# Patient Record
Sex: MALE | Race: WHITE | NOT HISPANIC OR LATINO | Employment: FULL TIME | URBAN - METROPOLITAN AREA
[De-identification: names, ages, dates, MRNs, and addresses within clinical notes are randomized per-mention and may not be internally consistent; named-entity substitution may affect disease eponyms.]

---

## 2018-07-20 ENCOUNTER — HISTORICAL (OUTPATIENT)
Dept: ADMINISTRATIVE | Facility: HOSPITAL | Age: 68
End: 2018-07-20

## 2018-11-02 ENCOUNTER — HISTORICAL (OUTPATIENT)
Dept: ADMINISTRATIVE | Facility: HOSPITAL | Age: 68
End: 2018-11-02

## 2018-11-02 LAB
ALBUMIN SERPL-MCNC: 4.3 GM/DL (ref 3.4–5)
ALBUMIN/GLOB SERPL: 1.26 {RATIO} (ref 1.5–2.5)
ALP SERPL-CCNC: 66 UNIT/L (ref 38–126)
ALT SERPL-CCNC: 23 UNIT/L (ref 7–52)
AST SERPL-CCNC: 21 UNIT/L (ref 15–37)
BILIRUB SERPL-MCNC: 0.6 MG/DL (ref 0.2–1)
BILIRUBIN DIRECT+TOT PNL SERPL-MCNC: 0.2 MG/DL (ref 0–0.5)
BILIRUBIN DIRECT+TOT PNL SERPL-MCNC: 0.4 MG/DL
BUN SERPL-MCNC: 17 MG/DL (ref 7–18)
CALCIUM SERPL-MCNC: 9.2 MG/DL (ref 8.5–10)
CHLORIDE SERPL-SCNC: 103 MMOL/L (ref 98–107)
CHOLEST SERPL-MCNC: 113 MG/DL (ref 0–200)
CHOLEST/HDLC SERPL: 3.2 {RATIO}
CO2 SERPL-SCNC: 30 MMOL/L (ref 21–32)
CREAT SERPL-MCNC: 0.95 MG/DL (ref 0.6–1.3)
EST. AVERAGE GLUCOSE BLD GHB EST-MCNC: 131 MG/DL
GLOBULIN SER-MCNC: 3.4 GM/DL (ref 1.2–3)
GLUCOSE SERPL-MCNC: 140 MG/DL (ref 74–106)
HBA1C MFR BLD: 6.2 % (ref 4.4–6.4)
HDLC SERPL-MCNC: 35 MG/DL (ref 35–60)
LDLC SERPL CALC-MCNC: 64 MG/DL (ref 0–129)
POTASSIUM SERPL-SCNC: 4.5 MMOL/L (ref 3.5–5.1)
PROT SERPL-MCNC: 7.7 GM/DL (ref 6.4–8.2)
SODIUM SERPL-SCNC: 139 MMOL/L (ref 136–145)
TRIGL SERPL-MCNC: 91 MG/DL (ref 30–150)
VLDLC SERPL CALC-MCNC: 18.2 MG/DL

## 2019-01-03 ENCOUNTER — HISTORICAL (OUTPATIENT)
Dept: ADMINISTRATIVE | Facility: HOSPITAL | Age: 69
End: 2019-01-03

## 2019-05-09 ENCOUNTER — HISTORICAL (OUTPATIENT)
Dept: ADMINISTRATIVE | Facility: HOSPITAL | Age: 69
End: 2019-05-09

## 2019-05-09 LAB
ABS NEUT (OLG): 4.8 X10(3)/MCL (ref 2.1–9.2)
ALBUMIN SERPL-MCNC: 4.3 GM/DL (ref 3.4–5)
ALBUMIN/GLOB SERPL: 1.16 {RATIO} (ref 1.5–2.5)
ALP SERPL-CCNC: 77 UNIT/L (ref 38–126)
ALT SERPL-CCNC: 27 UNIT/L (ref 7–52)
AST SERPL-CCNC: 21 UNIT/L (ref 15–37)
BILIRUB SERPL-MCNC: 0.6 MG/DL (ref 0.2–1)
BILIRUBIN DIRECT+TOT PNL SERPL-MCNC: 0.2 MG/DL (ref 0–0.5)
BILIRUBIN DIRECT+TOT PNL SERPL-MCNC: 0.4 MG/DL
BUN SERPL-MCNC: 17 MG/DL (ref 7–18)
CALCIUM SERPL-MCNC: 9 MG/DL (ref 8.5–10)
CHLORIDE SERPL-SCNC: 102 MMOL/L (ref 98–107)
CHOLEST SERPL-MCNC: 110 MG/DL (ref 0–200)
CHOLEST/HDLC SERPL: 3.2 {RATIO}
CO2 SERPL-SCNC: 28 MMOL/L (ref 21–32)
CREAT SERPL-MCNC: 0.96 MG/DL (ref 0.6–1.3)
CREAT UR-MCNC: 300 MG/DL
ERYTHROCYTE [DISTWIDTH] IN BLOOD BY AUTOMATED COUNT: 12.8 % (ref 11.5–17)
EST. AVERAGE GLUCOSE BLD GHB EST-MCNC: 137 MG/DL
GLOBULIN SER-MCNC: 3.7 GM/DL (ref 1.2–3)
GLUCOSE SERPL-MCNC: 135 MG/DL (ref 74–106)
HBA1C MFR BLD: 6.4 % (ref 4.4–6.4)
HCT VFR BLD AUTO: 43.3 % (ref 42–52)
HDLC SERPL-MCNC: 34 MG/DL (ref 35–60)
HGB BLD-MCNC: 14.6 GM/DL (ref 14–18)
LDLC SERPL CALC-MCNC: 58 MG/DL (ref 0–129)
LYMPHOCYTES # BLD AUTO: 2.8 X10(3)/MCL (ref 0.6–3.4)
LYMPHOCYTES NFR BLD AUTO: 32.4 % (ref 13–40)
MCH RBC QN AUTO: 33 PG (ref 27–31.2)
MCHC RBC AUTO-ENTMCNC: 34 GM/DL (ref 32–36)
MCV RBC AUTO: 98 FL (ref 80–94)
MICROALBUMIN UR-MCNC: 30 MG/L
MICROALBUMIN/CREAT RATIO PNL UR: <30 MG/GM
MONOCYTES # BLD AUTO: 0.9 X10(3)/MCL (ref 0.1–1.3)
MONOCYTES NFR BLD AUTO: 11.1 % (ref 0.1–24)
NEUTROPHILS NFR BLD AUTO: 56.5 % (ref 47–80)
PLATELET # BLD AUTO: 291 X10(3)/MCL (ref 130–400)
PMV BLD AUTO: 8.1 FL (ref 9.4–12.4)
POTASSIUM SERPL-SCNC: 4.7 MMOL/L (ref 3.5–5.1)
PROT SERPL-MCNC: 8 GM/DL (ref 6.4–8.2)
PSA SERPL-MCNC: 0.37 NG/ML (ref 0–4.5)
RBC # BLD AUTO: 4.42 X10(6)/MCL (ref 4.7–6.1)
SODIUM SERPL-SCNC: 136 MMOL/L (ref 136–145)
TRIGL SERPL-MCNC: 92 MG/DL (ref 30–150)
VLDLC SERPL CALC-MCNC: 18.4 MG/DL
WBC # SPEC AUTO: 8.5 X10(3)/MCL (ref 4.5–11.5)

## 2021-05-14 ENCOUNTER — HISTORICAL (OUTPATIENT)
Dept: ADMINISTRATIVE | Facility: HOSPITAL | Age: 71
End: 2021-05-14

## 2021-12-08 LAB — GLUCOSE SERPL-MCNC: 140 MG/DL (ref 70–110)

## 2022-04-07 ENCOUNTER — HISTORICAL (OUTPATIENT)
Dept: ADMINISTRATIVE | Facility: HOSPITAL | Age: 72
End: 2022-04-07

## 2022-04-24 VITALS
HEIGHT: 68 IN | DIASTOLIC BLOOD PRESSURE: 72 MMHG | SYSTOLIC BLOOD PRESSURE: 116 MMHG | WEIGHT: 180.13 LBS | BODY MASS INDEX: 27.3 KG/M2

## 2022-04-28 NOTE — OP NOTE
Patient:   Diego Torres            MRN: [id number]            FIN: [id number]               Age:   67 years     Sex:  Male     :  1950   Associated Diagnoses:   None   Author:   Chano Alvarez MD      Preoperative Diagnosis: Cataract Left eye    Postoperative Diagnosis: Cataract Left eye    Procedure: Phacoemulsification with intaocular lens implantations Left eye    Surgeon: Chano Alvarez MD    Assistant: Amber Narayan, [facility]    Anestheisa: Topical    Complications: None    The patient was brought to the [facility] and positioned.  A surgical timeout, capsulotomy, lens fragmentation and limbal relaxing incisions were performed.  The patient was brought into the operating suite, where the patient was correctly identified as was the operative eye via timeout.  The patient was prepped and draped in a sterile ophthalmic fashion.  A lid speculum was placed in the operative eye and the microscope was brought into place.  A 1.0mm paracentesis was then made at (6) o'clock.  The anterior chamber was filled with Endocoat.  A (temporal) clear corneal incision was made with a 2.4 mm keratome.  Hydrodissection and hydrodelineation was performed with upreserved 1% Xylocaine.  The nucleus was then phacoemulsified with the Abbott phacoemulsification hand-piece with a total of (14) EFX.  The cortex was then removed with the Kd I/A hand-piece. An CRISTO lens model ([id number]) with a power of (18.0) was placed in the capsular bag.  The Helon was then removed from the eye with the Kd I/A hand piece. The anterior chamber was inflated and the wounds were hydrated with BSS.  The wounds were checked with Weck-Abigail sponges and found to be watertight.  The lid speculum was removed and topical antibiotics were placed on the operative eye.  The patient was brought to PACU in good condition.        Surgery Date 18 [facility]

## 2022-04-28 NOTE — OP NOTE
Patient:   Diego Torres            MRN: [id number]            FIN: [id number]               Age:   70 years     Sex:  Male     :  1950   Associated Diagnoses:   None   Author:   Chano Alvarez MD      PREOPERATIVE DIAGNOSIS: Cataract Right eye    POSTOPERATIVE DIAGNOSIS: Cataract Right eye    PROCEDURE: Phacoemulsification with intraocular lens implantations Right eye    SURGEON: Chano Alvarez MD    ASSISTANT: Amber Narayan, [facility]    ANESTHEISA: MAC    COMPLICATIONS: NONE    DESCRIPTION OF PROCEDURE: The patient was to the [employer] laser.  The patient was marked at 0 & 180 degrees.   A time out was performed.  The capsulotomy and lens fragmentation were completed.  Then the patient was brought into the operating suite, where the patient was correctly identified as was the operative eye via timeout.  The patient was prepped and draped in a sterile ophthalmic fashion.  A lid speculum was placed in the operative eye and the microscope was brought into place.  The eye was then marked using a axis marker for the desired position of the IOL implant.  A 1.0 mm paracentesis was then made at (12) o'clock.  The anterior chamber was filled with Endocoat.  A (temporal) clear corneal incision was made with a 2.4 mm keratome blade.  A 6.0 mm corneal marking ring was used to sonja the cornea centered over the visual axis.  A 5.00 mm continuous curvilinear capsulorhexis was fashioned using a cystotome and microcapsular forceps.  Hydrodissection and hydrodelineation was performed with unpreserved 1% Xylocaine.  The nucleus was then phacoemulsified with the Abbott phacoemulsification hand-piece with a total of (11) EFX.  The cortex was then removed with the I/A hand-piece.  The capsular bag was filled with Helon.  The lens model ([id number]) with the power of (19.0) was placed in the capsular bag at (175) degrees.  The Helon was then removed from the eye with the I/A hand -piece.  The anterior chamber was inflated and the  wounds were hydrated with BSS.  The wounds were checked with Weck-Abigail sponges and found to be watertight.  The lid speculum was removed and topical antibiotics were placed on the operative eye.  The patient was brought to the PACU in good condition.

## 2022-05-01 NOTE — HISTORICAL [facility] CERNER
This is a historical note converted from Lindsey. Formatting and pictures may have been removed.  Please reference Lindsey for original formatting and attached multimedia. Chief Complaint  6 month recheck  History of Present Illness  having some right knee pain for past couple months so hasnt been walking as much;  some cough and congestion/occasional wheeze at night  Review of Systems  Comprehensive Review of Systems performed with no exceptions for new complaints other than as noted in HPI.  Physical Exam  Vitals & Measurements  HR:?76(Peripheral)? BP:?126/78?  HT:?173?cm? HT:?173?cm? WT:?82?kg? WT:?82?kg? BMI:?27.4?  ?  PHYSICAL EXAM  ?   WDWN patient in NAD, ?AFVSS  HEENT - no acute abnormality  ??????????????? oropharynx WNL  HEART - RRR  LUNGS -? CTA  ABDOMEN - benign, NTND;? no peritoneal signs  EXTREMITIES - No CCE  SKIN - warm, dry, intact  PSYCH - affect appropriate;? alert and oriented  NEURO- no new deficits noted;? cranial nerves grossly intact  ?mild tenderness right medical joint line  small SK left lower later hip  Assessment/Plan  1.?HLD (hyperlipidemia)  ?last LDL 66  Ordered:  Clinic Follow up, *Est. 05/22/19 8:45:00 CDT, PLEASE MAKE THIS A 30 MINUTE CPX, Order for future visit, HLD (hyperlipidemia), [facility]  Office/Outpatient Visit Level 3 Established [id number] PC, HLD (hyperlipidemia)  Diabetes mellitus, type 2  Elevated coronary artery calcium score  Right knee pain, [facility] - AFP, 11/02/18 8:31:00 CDT  ?  2.?Diabetes mellitus, type 2  ?last A1c 6.4  Ordered:  Office/Outpatient Visit Level 3 Established [id number] PC, HLD (hyperlipidemia)  Diabetes mellitus, type 2  Elevated coronary artery calcium score  Right knee pain, [facility] - [facility], 11/02/18 8:31:00 CDT  ?  3.?Elevated coronary artery calcium score  ?asymptomatic  Ordered:  Office/Outpatient Visit Level 3 Established [id number] PC, HLD (hyperlipidemia)  Diabetes mellitus, type 2  Elevated coronary artery calcium score  Right knee pain, [facility]  [facility] - [facility], 11/02/18 8:31:00 CDT  ?  4.?Right knee pain  ?knee brace/ gave packet of exercises/ hell call if needs PT  Ordered:  Office/Outpatient Visit Level 3 Established 11593 PC, HLD (hyperlipidemia)  Diabetes mellitus, type 2  Elevated coronary artery calcium score  Right knee pain, [facility] - [facility], 11/02/18 8:31:00 CDT  ?   Problem List/Past Medical History  Ongoing  Anemia  Cataract  Coronary artery disease  Diabetes mellitus, type 2  ED (erectile dysfunction)  Elevated coronary artery calcium score  HLD (hyperlipidemia)  Medication management  Historical  Upper back pain  Procedure/Surgical History  29202 - LT CATARACT W/IOL 1 STA PHACO (Left) (07/20/2018)  CT of heart without contrast with calcium scoring (02/01/2011)  Colonoscopy (03/11/2009)   Medications  aspirin 81 mg oral tablet, 81 mg= 1 tab(s), Oral, Daily  ATORVASTATIN TAB 80MG  PROLENSA SOL 0.07%  Zetia, 10 mg, Oral, Daily  Allergies  No Known Medication Allergies  Social History  Alcohol  Current, 03/28/2018  Employment/School  Employed, 09/27/2018  Home/Environment  Lives with Spouse. Living situation: Home/Independent., 09/27/2018  Substance Abuse  Never, 09/27/2018  Tobacco  Never smoker, 06/02/2016  Family History  Dementia: Mother.  Heart disease.: Father.  Hyperlipidemia.: Brother.  Hypertension.: Brother.  Myocardial infarct: Father.  Immunizations  Vaccine Date Status   pneumococcal 23-polyvalent vaccine 03/28/2018 Given   pneumococcal 13-valent conjugate vaccine 03/08/2016 Recorded   influenza virus vaccine, inactivated 10/07/2015 Recorded   zoster vaccine live 03/05/2015 Recorded   tetanus/diphth/pertuss (Tdap) adult/adol 10/14/2011 Recorded   Health Maintenance  Health Maintenance  ???Pending?(in the next year)  ??? ??OverDue  ??? ? ? ?Coronary Artery Disease Maintenance-Antiplatelet Agent Prescribed due??and every?  ??? ? ? ?Coronary Artery Disease Maintenance-Lipid Lowering Therapy due??and every?  ??? ? ? ?Pneumococcal Vaccine  due??and every?  ??? ? ? ?Aspirin Therapy for CVD Prevention due??06/02/17??and every 1??year(s)  ??? ??Due?  ??? ? ? ?ADL Screening due??11/03/18??and every 1??year(s)  ??? ? ? ?Alcohol Misuse Screening due??11/03/18??and every 1??year(s)  ??? ? ? ?Cognitive Screening due??11/03/18??and every 1??year(s)  ??? ? ? ?Diabetes Maintenance-Eye Exam due??11/03/18??and every?  ??? ? ? ?Diabetes Maintenance-Foot Exam due??11/03/18??and every?  ??? ? ? ?Diabetes Maintenance-Medication Prescribed due??11/03/18??and every 1??year(s)  ??? ? ? ?Diabetes Maintenance-Urine Dipstick due??11/03/18??Variable frequency  ??? ? ? ?Functional Assessment due??11/03/18??and every 1??year(s)  ??? ? ? ?Geriatric Depression Screening due??11/03/18??and every 1??year(s)  ??? ? ? ?Smoking Cessation due??11/03/18??Variable frequency  ??? ??Due In Future?  ??? ? ? ?Colorectal Screening (Senior Wellness) not due until??03/09/19??and every 10??year(s)  ??? ? ? ?Diabetes Maintenance-Microalbumin not due until??03/28/19??and every 1??year(s)  ??? ? ? ?Advance Directive not due until??07/11/19??and every 1??year(s)  ??? ? ? ?Fall Risk Assessment not due until??07/20/19??and every 1??year(s)  ??? ? ? ?Coronary Artery Disease Maintenance-Electrocardiogram not due until??07/20/19??and every 1??year(s)  ??? ? ? ?Diabetes Maintenance-Fasting Lipid Profile not due until??11/02/19??and every 1??year(s)  ??? ? ? ?Diabetes Maintenance-HgbA1c not due until??11/02/19??and every 1??year(s)  ??? ? ? ?Obesity Screening not due until??11/02/19??and every 1??year(s)  ??? ? ? ?Coronary Artery Disease Maintenance-BMP not due until??11/02/19??and every 1??year(s)  ??? ? ? ?Diabetes Maintenance-Serum Creatinine not due until??11/02/19??and every 1??year(s)  ???Satisfied?(in the past 1 year)  ??? ??Satisfied?  ??? ? ? ?Advance Directive on??07/11/18.??Satisfied by Jenny Moreno LPN  ??? ? ? ?Blood Pressure Screening on??11/02/18.??Satisfied by Mehnaz Juares  ??? ? ?  ?Body Mass Index Check on??11/02/18.??Satisfied by Mehnaz Juares  ??? ? ? ?Coronary Artery Disease Maintenance-BMP on??11/02/18.??Satisfied by Ibrahima Chau  ??? ? ? ?Coronary Artery Disease Maintenance-Electrocardiogram on??07/20/18.??Satisfied by Yadi Thomas RN  ??? ? ? ?Coronary Artery Disease Maintenance-Lipid Lowering Therapy on??07/11/18.  ??? ? ? ?Diabetes Maintenance-Fasting Lipid Profile on??11/02/18.??Satisfied by Ibrahima Chau  ??? ? ? ?Diabetes Maintenance-HgbA1c on??11/02/18.??Satisfied by Ibrahima Chau  ??? ? ? ?Diabetes Maintenance-Serum Creatinine on??11/02/18.??Satisfied by Ibrahima Chau  ??? ? ? ?Diabetes Maintenance-Microalbumin on??03/28/18.??Satisfied by Kat Courtney  ??? ? ? ?Diabetes Screening on??11/02/18.??Satisfied by Ibrahima Chau  ??? ? ? ?Fall Risk Assessment on??07/20/18.??Satisfied by Yadi Thomas RN  ??? ? ? ?Lipid Screening on??11/02/18.??Satisfied by Ibrahima Chau  ??? ? ? ?Obesity Screening on??11/02/18.??Satisfied by Mehnaz Juares  ??? ? ? ?Pneumococcal Vaccine on??03/28/18.??Satisfied by Judit Nash  ?  ?

## 2022-05-01 NOTE — HISTORICAL [facility] CERNER
This is a historical note converted from Lindsey. Formatting and pictures may have been removed.  Please reference Lindsey for original formatting and attached multimedia. Chief Complaint  wellness  History of Present Illness  having wheezing when lying down at night, got better after last OV with huseyin  h/o childhood asthma  seeing eye doctor today  no other c/o  no cardiac or respiratory issues  ?  Review of Systems  Except as noted above has no new complaints regarding:  Constitutional:?no weight gain,?no weight loss,?no fatigue,?no fever,?no chills,?no weakness.  Eyes:?no vision loss/changes,??no pain,?no double vision,??  Head:?no headache,?no head injury,?no neck pain.?  Neck:??no lumps,?no swollen glands,?no stiffness.  Ears:??no ringing,?no earache,?no drainage.?  Nose:?no stuffiness,?no discharge,?no itching,no postnasal drip, ?no nosebleeds,?no sinus pain.  Throat:?no dry mouth,?no sore throat,?no hoarseness,?no thrush,?no non-healing sores.  Cardiovascular:?no chest pain or discomfort,?no tightness,?no palpitations,?no SOB with activity,?no difficulty breathing while supine,?no swelling,?no sudden awakening from sleep with SOB.  Vascular:?no calf pain with walking,?no leg cramping.  Respiratory:??no cough,?no sputum,?no coughing up blood,?no SOB,?no wheezing,?no painful breathing.  Gastrointestinal:?no swallowing difficulty,?no heartburn,?no change in appetite,?no nausea,?no change in bowel habits,?no rectal bleeding,?no constipation,?no diarrhea,?no yellow eyes or skin.  Urinary:?no frequency,?no urgency,?no burning or pain,?no blood in urine,?no incontinence,?no change in urinary strength.  Musculoskeletal:?no muscle or joint pain,?no stiffness,?,?no redness of joints,?no swelling of joints,?no trauma.  Skin:?no rashes,?no lumps,?no itching,?no dryness,??no hair or nail changes.  Neurologic:?no dizziness,?no fainting,?no seizures,?no weakness,?no numbness,?no tingling,?no tremors.  Psychiatric:?no  nervousness,??no depression,?no memory loss.  Endocrine:?no heat or cold intolerance,??no frequent urination,?no thirst,?no change in appetite.  Hematologic:?no ease of bruising,?no ease of bleeding.  ?  ?  ?  ?  Physical Exam  Vitals & Measurements  HR:?78(Peripheral)? BP:?128/78?  HT:?172?cm? WT:?80.6?kg? BMI:?27.24?  ?  PHYSICAL EXAM  ?   Well developed, well nourished, NAD, alert and oriented x4  Vitals reviewed  Head: NCAT  Eyes: EOMI, conjunctiva WNL, pupils symmetric  Ears: TMs and EACs clear  Nose: Mucosa WNL, No discharge, No sinus tenderness  O/P: No erythema or exudate  Neck: supple, FROM, no LA, no thyromegaly, no bruits auscultated  CV: RRR no MRG, peripheral pulses intact  Pulmonary: Effort normal and breath sounds normal, no wheeze  Abdomen: soft, NTND, no HSM;? ?NABS; no peritoneal signs?????  Musculoskeletal:? no tenderness, joints wnl for acute changes, FROM, neg SLR, no CCE  Skin: warm, dry, intact  Neuro: no motor/sensory deficits, reflexes 2+, cranial nerves grossly intact, cerebellar function appears intact  Lymphadenopathy: no abnormalities noted  Psychiatric: Cooperative, appropriate mood and affect, appears to have normal judgment  ?  ?  ?  ?  Assessment/Plan  1.?Wellness examination?Z00.00  ?up to date with screenings  work on diet/exercise  2.?HLD (hyperlipidemia)?E78.5  will recheck  3.?Diabetes mellitus, type 2?E11.9  good control  4.?Anemia?D64.9  has been good  5.?Asthma?J45.909  will try Symbicort , he will call if develops issues  rec shingrix  Referrals  Clinic Follow up, *Est. 11/18/19 9:45:00 CST, Order for future visit, Diabetes mellitus, type 2, [facility]   Problem List/Past Medical History  Ongoing  Anemia  Asthma  Cataract  Coronary artery disease  Diabetes mellitus, type 2  ED (erectile dysfunction)  Elevated coronary artery calcium score  HLD (hyperlipidemia)  Medication management  Wellness examination  Historical  Upper back pain  Procedure/Surgical History  35386 -   CATARACT W/IOL 1 STA PHACO (Left) (07/20/2018)  Extracapsular cataract removal with insertion of intraocular lens prosthesis (1 stage procedure), manual or mechanical technique (eg, irrigation and aspiration or phacoemulsification) (07/20/2018)  Replacement of Left Lens with Synthetic Substitute, Percutaneous Approach (07/20/2018)  CT of heart without contrast with calcium scoring (02/01/2011)  Colonoscopy (03/11/2009)   Medications  aspirin 81 mg oral tablet, 81 mg= 1 tab(s), Oral, Daily  ATORVASTATIN TAB 80MG  Symbicort 160 mcg-4.5 mcg/inh inhalation aerosol, 2 puff(s), INH, BID, 3 refills  Zetia, 10 mg, Oral, Daily  Allergies  No Known Medication Allergies  Social History  Alcohol  Current, 03/28/2018  Employment/School  Employed, 09/27/2018  Home/Environment  Lives with Spouse. Living situation: Home/Independent., 09/27/2018  Substance Abuse  Never, 09/27/2018  Tobacco  Never (less than 100 in lifetime), N/A, 01/03/2019  Never smoker, 06/02/2016  Family History  Dementia: Mother.  Heart disease.: Father.  Hyperlipidemia.: Brother.  Hypertension.: Brother.  Myocardial infarct: Father.  Immunizations  Vaccine Date Status   influenza virus vaccine, inactivated 10/2018 Recorded   pneumococcal 23-polyvalent vaccine 03/28/2018 Given   pneumococcal 13-valent conjugate vaccine 03/08/2016 Recorded   influenza virus vaccine, inactivated 10/07/2015 Recorded   zoster vaccine live 03/05/2015 Recorded   tetanus/diphth/pertuss (Tdap) adult/adol 10/14/2011 Recorded   Health Maintenance  Health Maintenance  ???Pending?(in the next year)  ??? ??OverDue  ??? ? ? ?Coronary Artery Disease Maintenance-Antiplatelet Agent Prescribed due??and every?  ??? ? ? ?Coronary Artery Disease Maintenance-Lipid Lowering Therapy due??and every?  ??? ? ? ?Pneumococcal Vaccine due??and every?  ??? ? ? ?Aspirin Therapy for CVD Prevention due??06/02/17??and every 1??year(s)  ??? ? ? ?Advance Directive due??01/01/19??and every 1??year(s)  ??? ? ?  ?Alcohol Misuse Screening due??01/01/19??and every 1??year(s)  ??? ? ? ?Cognitive Screening due??01/01/19??and every 1??year(s)  ??? ? ? ?Fall Risk Assessment due??01/01/19??and every 1??year(s)  ??? ? ? ?Functional Assessment due??01/01/19??and every 1??year(s)  ??? ? ? ?Geriatric Depression Screening due??01/01/19??and every 1??year(s)  ??? ? ? ?Colorectal Screening (Senior Wellness) due??03/09/19??and every 10??year(s)  ??? ??Due?  ??? ? ? ?ADL Screening due??05/11/19??and every 1??year(s)  ??? ? ? ?Asthma Management-Asthma Education due??05/11/19??and every 6??month(s)  ??? ? ? ?Asthma Management-Spirometry due??05/11/19??Variable frequency  ??? ? ? ?Asthma Management-[place] Flow due??05/11/19??Variable frequency  ??? ? ? ?Asthma Management-Written Action Plan due??05/11/19??and every 6??month(s)  ??? ? ? ?Diabetes Maintenance-Foot Exam due??05/11/19??and every?  ??? ? ? ?Diabetes Maintenance-Medication Prescribed due??05/11/19??and every 1??year(s)  ??? ? ? ?Diabetes Maintenance-Urine Dipstick due??05/11/19??Variable frequency  ??? ??Due In Future?  ??? ? ? ?Diabetes Maintenance-Eye Exam not due until??06/28/19??and every 1??year(s)  ??? ? ? ?Coronary Artery Disease Maintenance-Electrocardiogram not due until??07/20/19??and every 1??year(s)  ??? ? ? ?Obesity Screening not due until??01/01/20??and every 1??year(s)  ??? ? ? ?Diabetes Maintenance-Fasting Lipid Profile not due until??05/08/20??and every 1??year(s)  ??? ? ? ?Diabetes Maintenance-HgbA1c not due until??05/08/20??and every 1??year(s)  ??? ? ? ?Hypertension Management-BMP not due until??05/08/20??and every 1??year(s)  ??? ? ? ?Hypertension Management-Blood Pressure not due until??05/08/20??and every 1??year(s)  ??? ? ? ?Coronary Artery Disease Maintenance-BMP not due until??05/09/20??and every 1??year(s)  ??? ? ? ?Diabetes Maintenance-Microalbumin not due until??05/09/20??and every 1??year(s)  ??? ? ? ?Diabetes Maintenance-Serum Creatinine not due  until??05/09/20??and every 1??year(s)  ???Satisfied?(in the past 1 year)  ??? ??Satisfied?  ??? ? ? ?Advance Directive on??07/11/18.??Satisfied by Jenny Moreno LPN  ??? ? ? ?Blood Pressure Screening on??05/09/19.??Satisfied by Mehnaz Juares LPN  ??? ? ? ?Body Mass Index Check on??05/09/19.??Satisfied by Mehnaz Juares LPN  ??? ? ? ?Coronary Artery Disease Maintenance-BMP on??05/09/19.??Satisfied by Ibrahima Chau  ??? ? ? ?Coronary Artery Disease Maintenance-Electrocardiogram on??07/20/18.??Satisfied by Yadi Thomas RN  ??? ? ? ?Coronary Artery Disease Maintenance-Lipid Lowering Therapy on??07/11/18.  ??? ? ? ?Diabetes Maintenance-Fasting Lipid Profile on??05/09/19.??Satisfied by Ibrahima Chau  ??? ? ? ?Diabetes Maintenance-HgbA1c on??05/09/19.??Satisfied by Ibrahima Chau  ??? ? ? ?Diabetes Maintenance-Microalbumin on??05/09/19.??Satisfied by Saira Garcia  ??? ? ? ?Diabetes Maintenance-Serum Creatinine on??05/09/19.??Satisfied by Ibrahima Chau  ??? ? ? ?Diabetes Screening on??05/09/19.??Satisfied by Ibrahima Chau  ??? ? ? ?Fall Risk Assessment on??07/20/18.??Satisfied by Yadi Thomas RN  ??? ? ? ?Hypertension Management-BMP on??05/09/19.??Satisfied by Ibrahima Chau  ??? ? ? ?Hypertension Management-Blood Pressure on??05/09/19.??Satisfied by Mehnaz Juares LPN  ??? ? ? ?Influenza Vaccine on??10/01/18.??Satisfied by Essence Martinez CMA  ??? ? ? ?Lipid Screening on??05/09/19.??Satisfied by Ibrahima Chau  ??? ? ? ?Obesity Screening on??05/09/19.??Satisfied by Mehnaz Juares LPN  ?  ?

## 2022-05-30 PROBLEM — D64.9 ANEMIA: Status: ACTIVE | Noted: 2022-05-30

## 2022-05-30 PROBLEM — I65.23 CAROTID STENOSIS, ASYMPTOMATIC, BILATERAL: Status: ACTIVE | Noted: 2020-09-21

## 2022-05-30 PROBLEM — Z95.1 HISTORY OF CORONARY ARTERY BYPASS GRAFT: Status: ACTIVE | Noted: 2022-05-30

## 2022-05-30 PROBLEM — R73.9 HYPERGLYCEMIA: Status: ACTIVE | Noted: 2022-05-30

## 2022-05-30 PROBLEM — I25.110 CORONARY ARTERY DISEASE INVOLVING NATIVE CORONARY ARTERY OF NATIVE HEART WITH UNSTABLE ANGINA PECTORIS: Status: ACTIVE | Noted: 2020-09-18

## 2022-05-30 PROBLEM — E78.5 HYPERLIPIDEMIA: Status: ACTIVE | Noted: 2022-05-30

## 2022-05-30 PROBLEM — Z79.899 HIGH RISK MEDICATION USE: Status: ACTIVE | Noted: 2022-05-30

## 2022-05-30 PROBLEM — J45.909 ASTHMA: Status: ACTIVE | Noted: 2022-05-30

## 2022-05-30 PROBLEM — H26.9 CATARACT: Status: ACTIVE | Noted: 2022-05-30

## 2022-05-30 PROBLEM — N52.9 ED (ERECTILE DYSFUNCTION): Status: ACTIVE | Noted: 2022-05-30

## 2022-05-31 PROBLEM — Z00.00 ENCOUNTER FOR ANNUAL WELLNESS EXAM IN MEDICARE PATIENT: Status: ACTIVE | Noted: 2022-05-31

## 2022-05-31 PROBLEM — I11.0 HYPERTENSIVE HEART DISEASE WITH HEART FAILURE: Status: ACTIVE | Noted: 2022-05-31

## 2022-05-31 PROBLEM — N40.1 BENIGN PROSTATIC HYPERPLASIA WITH NOCTURIA: Status: ACTIVE | Noted: 2022-05-31

## 2022-05-31 PROBLEM — Z95.1 S/P CABG X 3: Status: ACTIVE | Noted: 2020-11-02

## 2022-05-31 PROBLEM — R35.1 BENIGN PROSTATIC HYPERPLASIA WITH NOCTURIA: Status: ACTIVE | Noted: 2022-05-31

## 2022-05-31 PROBLEM — E11.9 TYPE 2 DIABETES MELLITUS WITHOUT COMPLICATION, WITHOUT LONG-TERM CURRENT USE OF INSULIN: Status: ACTIVE | Noted: 2022-05-31

## 2022-05-31 PROBLEM — I25.10 ARTERIOSCLEROSIS OF CORONARY ARTERY: Status: ACTIVE | Noted: 2020-09-18

## 2022-09-05 PROBLEM — Z00.00 ENCOUNTER FOR ANNUAL WELLNESS EXAM IN MEDICARE PATIENT: Status: RESOLVED | Noted: 2022-05-31 | Resolved: 2022-09-05

## 2022-09-15 ENCOUNTER — HISTORICAL (OUTPATIENT)
Dept: ADMINISTRATIVE | Facility: HOSPITAL | Age: 72
End: 2022-09-15

## 2023-06-19 PROBLEM — I10 PRIMARY HYPERTENSION: Status: ACTIVE | Noted: 2023-06-19

## 2023-06-20 PROCEDURE — 86803 HEPATITIS C AB TEST: CPT | Performed by: FAMILY MEDICINE

## 2024-07-16 PROCEDURE — 82607 VITAMIN B-12: CPT | Performed by: FAMILY MEDICINE
